# Patient Record
Sex: MALE | Race: BLACK OR AFRICAN AMERICAN | NOT HISPANIC OR LATINO | Employment: FULL TIME | URBAN - METROPOLITAN AREA
[De-identification: names, ages, dates, MRNs, and addresses within clinical notes are randomized per-mention and may not be internally consistent; named-entity substitution may affect disease eponyms.]

---

## 2025-05-05 ENCOUNTER — HOSPITAL ENCOUNTER (EMERGENCY)
Facility: HOSPITAL | Age: 30
Discharge: HOME | End: 2025-05-05
Attending: EMERGENCY MEDICINE | Admitting: EMERGENCY MEDICINE

## 2025-05-05 VITALS
SYSTOLIC BLOOD PRESSURE: 178 MMHG | RESPIRATION RATE: 20 BRPM | DIASTOLIC BLOOD PRESSURE: 105 MMHG | TEMPERATURE: 97.8 F | HEART RATE: 101 BPM | OXYGEN SATURATION: 97 %

## 2025-05-05 DIAGNOSIS — F15.10 METHAMPHETAMINE USE (HCC): ICD-10-CM

## 2025-05-05 DIAGNOSIS — F22 PARANOIA (HCC): Primary | ICD-10-CM

## 2025-05-05 PROCEDURE — 99284 EMERGENCY DEPT VISIT MOD MDM: CPT | Performed by: EMERGENCY MEDICINE

## 2025-05-05 PROCEDURE — 99284 EMERGENCY DEPT VISIT MOD MDM: CPT

## 2025-05-05 NOTE — ED PROVIDER NOTES
Time reflects when diagnosis was documented in both MDM as applicable and the Disposition within this note       Time User Action Codes Description Comment    5/5/2025  4:09 AM Patrick Espana [F22] Paranoia ([facility])     5/5/2025  4:09 AM Patrick Espana [F15.10] Methamphetamine use ([facility])           ED Disposition       ED Disposition   Discharge    Condition   Stable    Date/Time   Mon May 5, 2025  4:08 AM    Comment   Gia Morris discharge to home/self care.                   Assessment & Plan       Medical Decision Making  30 y/o male presents to the ED via EMS for evaluation of possible paranoia.  Per EMS report, the patient's called 911 to discuss with police 5 times over the course of the evening, due to his concerns that someone had broken into his room and was following him.  The police were concerned that the patient was experiencing paranoia and advised to come to the ED for evaluation.  The patient states that he rents a room in a boarding house and tonight felt like someone had broken through his window and was following him.  He tried calling his , however he was unable to talk to the manager so he left his room and went to his mother's house.  He describes seeing a shadow in the corner of his vision while he was on his way to his mother's house and thought that someone was following him.  He also reports hearing a familiar voice that he thought was possibly the , however he did not see him.  He denies any alcohol intake or substance use tonight but notes that he used a small amount of methamphetamine yesterday.  He denies any suicidal ideation or homicidal ideation.  He denies any physical symptoms or complaints.  He denies any history of psychiatric diagnoses or treatment.    Vital signs reviewed.  On my assessment, the patient is awake, speaking in full sentences, however his speech appears slightly pressured and tangential at times.  He verbalizes thoughts and  experiences that appear to be consistent with paranoia.  He denies any thoughts of self-harm or harming others, no SI or HI.  Although the patient describes briefly seeing a shadow in the corner of his vision or hearing a familiar voice earlier tonight, he does not at this time appear to be responding to any internal stimuli during my assessment.  The patient is able to maintain conversation and does not appear to have any self-care deficits, he does not appear unkempt or malnourished. See physical exam documentation for full exam findings.  Differential diagnosis includes but is not limited to substance induced psychosis, other psychosis, bipolar disorder, schizophrenia, and/or other primary psychiatric diagnosis.  I discussed with the patient regarding the events he has been experiencing over the last day and informed him that on my assessment he appears to be experiencing paranoia, which I will most likely suspect is related to his recent substance use.  I offered the patient evaluation including labs, UDS, and crisis evaluation, as well as the possibility of admission.  The patient declined any further evaluation or testing and also declined crisis evaluation.  The patient verbalizes that he does not want to sign in voluntarily for 201.  On my assessment, the patient does not appear to have any criteria for 302/involuntary admission, however due to his paranoia I once again encouraged the patient to reconsider regarding crisis evaluation and possible 201.  The patient again verbalizes that he is not interested in that course of action at this time.  The patient is requesting discharge home and he is stable for discharge at this time.  I provided the patient with list of outpatient resources as well as instruction to return to the ED if he reconsiders or feels like his symptoms are worsening.             Medications - No data to display    ED Risk Strat Scores                    No data recorded        SBIRT  22yo+      Flowsheet Row Most Recent Value   Initial Alcohol Screen: US AUDIT-C     1. How often do you have a drink containing alcohol? 0 Filed at: 05/05/2025 0348   2. How many drinks containing alcohol do you have on a typical day you are drinking?  0 Filed at: 05/05/2025 0348   3a. Male UNDER 65: How often do you have five or more drinks on one occasion? 0 Filed at: 05/05/2025 0348   Audit-C Score 0 Filed at: 05/05/2025 0348   GOLDY: How many times in the past year have you...    Used an illegal drug or used a prescription medication for non-medical reasons? Never Filed at: 05/05/2025 0348                            History of Present Illness       Chief Complaint   Patient presents with    Medical Problem     Pt brought in via EMS for potential paranoia and calling the police 5 times          History reviewed. No pertinent past medical history.   History reviewed. No pertinent surgical history.   Family History   Family history unknown: Yes      Social History     Tobacco Use    Smoking status: Every Day     Current packs/day: 0.50     Types: Cigarettes    Smokeless tobacco: Never   Vaping Use    Vaping status: Never Used   Substance Use Topics    Drug use: Yes     Types: Methamphetamines     Comment: last done 5/3/25      E-Cigarette/Vaping    E-Cigarette Use Never User       E-Cigarette/Vaping Substances      I have reviewed and agree with the history as documented.     28 y/o male presents to the ED via EMS for evaluation of possible paranoia.  Per EMS report, the patient's called 911 to discuss with police 5 times over the course of the evening, due to his concerns that someone had broken into his room and was following him.  The police were concerned that the patient was experiencing paranoia and advised to come to the ED for evaluation.  The patient states that he rents a room in a boarding house and tonight felt like someone had broken through his window and was following him.  He tried calling his  , however he was unable to talk to the manager so he left his room and went to his mother's house.  He describes seeing a shadow in the corner of his vision while he was on his way to his mother's house and thought that someone was following him.  He also reports hearing a familiar voice that he thought was possibly the , however he did not see him.  He denies any alcohol intake or substance use tonight but notes that he used a small amount of methamphetamine yesterday.  He denies any suicidal ideation or homicidal ideation.  He denies any physical symptoms or complaints.  He denies any history of psychiatric diagnoses or treatment.        Review of Systems   Constitutional:  Negative for chills and fever.   HENT:  Negative for congestion, rhinorrhea and sore throat.    Respiratory:  Negative for cough and shortness of breath.    Cardiovascular:  Negative for chest pain and palpitations.   Gastrointestinal:  Negative for abdominal pain, diarrhea, nausea and vomiting.   Genitourinary:  Negative for dysuria and hematuria.   Musculoskeletal:  Negative for back pain and neck pain.   Neurological:  Negative for weakness, light-headedness, numbness and headaches.   Psychiatric/Behavioral:          See HPI.   All other systems reviewed and are negative.          Objective       ED Triage Vitals [05/05/25 0346]   Temperature Pulse Blood Pressure Respirations SpO2 Patient Position - Orthostatic VS   97.8 °F (36.6 °C) 101 (!) 178/105 20 97 % --      Temp Source Heart Rate Source BP Location FiO2 (%) Pain Score    Oral Monitor -- -- No Pain      Vitals      Date and Time Temp Pulse SpO2 Resp BP Pain Score FACES Pain Rating User   05/05/25 0346 97.8 °F (36.6 °C) 101 97 % 20 178/105 No Pain -- CW            Physical Exam  Vitals and nursing note reviewed.   Constitutional:       General: He is not in acute distress.     Appearance: Normal appearance. He is normal weight.   HENT:      Head:  Normocephalic and atraumatic.      Right Ear: External ear normal.      Left Ear: External ear normal.      Nose: Nose normal.      Mouth/Throat:      Mouth: Mucous membranes are moist.      Pharynx: Oropharynx is clear. No oropharyngeal exudate or posterior oropharyngeal erythema.   Eyes:      Extraocular Movements: Extraocular movements intact.      Conjunctiva/sclera: Conjunctivae normal.      Pupils: Pupils are equal, round, and reactive to light.   Cardiovascular:      Rate and Rhythm: Normal rate and regular rhythm.      Pulses: Normal pulses.      Heart sounds: Normal heart sounds.   Pulmonary:      Effort: Pulmonary effort is normal. No respiratory distress.      Breath sounds: Normal breath sounds. No wheezing or rales.   Abdominal:      General: Abdomen is flat. Bowel sounds are normal. There is no distension.      Palpations: Abdomen is soft.      Tenderness: There is no abdominal tenderness. There is no right CVA tenderness, left CVA tenderness or guarding.   Musculoskeletal:         General: No swelling or tenderness. Normal range of motion.      Cervical back: Normal range of motion and neck supple. No tenderness.   Skin:     General: Skin is warm and dry.   Neurological:      General: No focal deficit present.      Mental Status: He is alert and oriented to person, place, and time.   Psychiatric:      Comments: On my assessment, the patient is awake, speaking in full sentences, however his speech appears slightly pressured and tangential at times.  He verbalizes thoughts and experiences that appear to be consistent with paranoia.  He denies any thoughts of self-harm or harming others, no SI or HI.  Although the patient describes briefly seeing a shadow in the corner of his vision or hearing a familiar voice earlier tonight, he does not at this time appear to be responding to any internal stimuli during my assessment.  The patient is able to maintain conversation and does not appear to have any  self-care deficits, he does not appear unkempt or malnourished.         Results Reviewed       None            No orders to display       Procedures    ED Medication and Procedure Management   None     Patient's Medications    No medications on file     No discharge procedures on file.  ED SEPSIS DOCUMENTATION   Time reflects when diagnosis was documented in both MDM as applicable and the Disposition within this note       Time User Action Codes Description Comment    5/5/2025  4:09 AM Patrick Espana [F22] Paranoia ([facility])     5/5/2025  4:09 AM Patrick Espana [F15.10] Methamphetamine use ([facility])                  Patrick Espana MD  05/05/25 0421

## 2025-05-19 ENCOUNTER — HOSPITAL ENCOUNTER (EMERGENCY)
Facility: HOSPITAL | Age: 30
Discharge: HOME | End: 2025-05-19
Attending: INTERNAL MEDICINE

## 2025-05-19 VITALS
WEIGHT: 180.78 LBS | BODY MASS INDEX: 26.78 KG/M2 | DIASTOLIC BLOOD PRESSURE: 76 MMHG | TEMPERATURE: 97.4 F | HEART RATE: 93 BPM | RESPIRATION RATE: 16 BRPM | SYSTOLIC BLOOD PRESSURE: 142 MMHG | HEIGHT: 69 IN | OXYGEN SATURATION: 100 %

## 2025-05-19 DIAGNOSIS — L03.115 CELLULITIS OF RIGHT LOWER EXTREMITY: Primary | ICD-10-CM

## 2025-05-19 PROCEDURE — 99282 EMERGENCY DEPT VISIT SF MDM: CPT

## 2025-05-19 PROCEDURE — 99284 EMERGENCY DEPT VISIT MOD MDM: CPT | Performed by: INTERNAL MEDICINE

## 2025-05-19 RX ORDER — CEPHALEXIN 500 MG/1
500 CAPSULE ORAL EVERY 6 HOURS SCHEDULED
Qty: 28 CAPSULE | Refills: 0 | Status: SHIPPED | OUTPATIENT
Start: 2025-05-19 | End: 2025-05-26

## 2025-05-19 RX ORDER — CEPHALEXIN 500 MG/1
500 CAPSULE ORAL EVERY 6 HOURS SCHEDULED
Qty: 28 CAPSULE | Refills: 0 | Status: SHIPPED | OUTPATIENT
Start: 2025-05-19 | End: 2025-05-19

## 2025-05-19 RX ADMIN — CEPHALEXIN 500 MG: 250 CAPSULE ORAL at 07:19

## 2025-05-19 NOTE — ED NOTES
"Pt is asked if he would like anyone called to pick him up. Pt responds with \"may I make a personal call.\" RN enforces that he make a call for a ride. In forms that pt that once he provides a number the RN will dial the number and give them the phone. Pt insists that he is given a phone to call whomever he chooses. RN informs the pt that this RN dials numbers and confirms connection before providing phones to pt. Pt insists that he will use the phone himself and demands RN give him the phone, refusing to give number of person he would like to call. RN offers to set pt up with a lyft home. Pt insists that he would like to use the phone for a \"personal call\". Pt pending discharge at this time.      Suma Bach RN  05/19/25 3131    "

## 2025-05-19 NOTE — ED NOTES
Pt states that he was visiting a friend late last night. He states he is experiencing a rodent infestation at his apartment for the past month waiting on the landlord to correct it. Pt states he thought he saw some bed bugs when he changed his sheets the other day. No bed bugs noted on today's assessment.      Suma Bach RN  05/19/25 0701

## 2025-05-19 NOTE — ED PROVIDER NOTES
Time reflects when diagnosis was documented in both MDM as applicable and the Disposition within this note       Time User Action Codes Description Comment    5/19/2025  7:30 AM Ashley Bynum Add [L03.115] Cellulitis of right lower extremity           ED Disposition       ED Disposition   Discharge    Condition   Stable    Date/Time   Mon May 19, 2025  7:31 AM    Comment   Gia Morris discharge to home/self care.                   Assessment & Plan       Medical Decision Making  This is a 29 years old brought by EMS as patient was found at the parking lot of [email or web address] with no shoes of shift after he was spending the night with his friend.  Patient stated that he has an abscess to the right thigh and he was bitten by possible insect or something else 2 weeks ago and he came here today to check it.  Patient also has possible insect bite at the sole of the left foot.  Patient has no fever and is not diabetic.  Patient has no discharge from the site of the bite.  Patient has history of paranoia and drug abuse in the past.  Patient denies taking any drugs or drinking alcohol.  Patient denies injecting himself with drugs.  Patient denies any psychiatric history.  Patient denies SI or HI.  The patient stated he does not want to see crisis or psychiatric.  Patient in no distress.  Physical exam significant for; At the anterior aspect of the right thigh, has small rounded area , could be at the site of the bite surrounding with mild erythema.  There is no discharge from it the area is not tender and it is not warm.  At the sole of the left foot has an area of redness but it is not tender no swelling no discharge.  Patient has skin infection which need antibiotic we will start him on Keflex.  Advised her to apply warm compresses on it.  And to follow-up with medical clinic.  Differential diagnoses include but not limited to; cellulitis, insect bite, eczema, allergic reaction.      Risk  Prescription drug  management.             Medications   cephalexin (KEFLEX) capsule 500 mg (500 mg Oral Given 5/19/25 0719)       ED Risk Strat Scores                    No data recorded        SBIRT 20yo+      Flowsheet Row Most Recent Value   Initial Alcohol Screen: US AUDIT-C     1. How often do you have a drink containing alcohol? 0 Filed at: 05/19/2025 0654   2. How many drinks containing alcohol do you have on a typical day you are drinking?  0 Filed at: 05/19/2025 0654   3a. Male UNDER 65: How often do you have five or more drinks on one occasion? 0 Filed at: 05/19/2025 0654   Audit-C Score 0 Filed at: 05/19/2025 0654   GOLDY: How many times in the past year have you...    Used an illegal drug or used a prescription medication for non-medical reasons? Monthly Filed at: 05/19/2025 0654                            History of Present Illness       Chief Complaint   Patient presents with    Abscess     Pt arrives via EMS PD called for pt at [facility]. Found with minimal clothing having been out all night. Pt states he has had a bug bite for 2 weeks on the right anterior thigh. Denies other symptoms. Denies medications PTA.        No past medical history on file.   No past surgical history on file.   Family History   Family history unknown: Yes      Social History[1]   E-Cigarette/Vaping    E-Cigarette Use Current Every Day User       E-Cigarette/Vaping Substances    Nicotine Yes       I have reviewed and agree with the history as documented.     This is a 29 years old brought by EMS, as patient was found in the parking lot of [employer].  Patient found with  no shirt or shoes.  A customer there called the police, the police called EMS and patient brought to the ER.  Patient stated that he has a bite possible from insect at the right thigh about 2 weeks ago and he has a bite on the sole of left foot, yesterday.  Patient  stated that this could be an abscess which need to be drained.  Patient has no fever, no  drainage, from the site of infection.  Patient has history of paranoia and history of drug abuse of methamphetamine.  Patient denies injecting himself with drugs.  Patient at the ER denies taking any drugs, or drinking alcohol.  Patient denies any psychiatric history at the ER.  Patient is not followed by a psychiatrist.  Patient denies SI or HI.  Offered to the patient to see crisis but he declined and he said he has no psych issues.  Patient laying on the bed calm quiet in no distress.      History provided by:  Patient   used: No        Review of Systems   Constitutional:  Negative for chills and fever.   HENT:  Negative for ear pain and sore throat.    Eyes:  Negative for pain and visual disturbance.   Respiratory:  Negative for cough and shortness of breath.    Cardiovascular:  Negative for chest pain and palpitations.   Gastrointestinal:  Negative for abdominal pain and vomiting.   Genitourinary:  Negative for dysuria and hematuria.   Musculoskeletal:  Negative for arthralgias and back pain.   Skin:  Negative for color change and rash.   Neurological:  Negative for seizures and syncope.   All other systems reviewed and are negative.          Objective       ED Triage Vitals [05/19/25 0651]   Temperature Pulse Blood Pressure Respirations SpO2 Patient Position - Orthostatic VS   (!) 97.4 °F (36.3 °C) 96 (!) 176/99 16 98 % Lying      Temp Source Heart Rate Source BP Location FiO2 (%) Pain Score    Oral Monitor Left arm -- 1      Vitals      Date and Time Temp Pulse SpO2 Resp BP Pain Score FACES Pain Rating User   05/19/25 0722 -- 93 100 % 16 142/76 1 -- FN   05/19/25 0655 -- -- -- -- -- 1 -- FN   05/19/25 0651 97.4 °F (36.3 °C) 96 98 % 16 176/99 1 -- FN            Physical Exam  Vitals and nursing note reviewed.   Constitutional:       General: He is not in acute distress.     Appearance: Normal appearance. He is well-developed. He is not ill-appearing, toxic-appearing or diaphoretic.   HENT:       Head: Normocephalic and atraumatic.      Right Ear: Ear canal normal.      Left Ear: Ear canal normal.      Nose: Nose normal. No congestion or rhinorrhea.      Mouth/Throat:      Mouth: Mucous membranes are moist.      Pharynx: No oropharyngeal exudate or posterior oropharyngeal erythema.     Eyes:      Extraocular Movements: Extraocular movements intact.      Conjunctiva/sclera: Conjunctivae normal.      Pupils: Pupils are equal, round, and reactive to light.       Cardiovascular:      Rate and Rhythm: Normal rate and regular rhythm.      Heart sounds: No murmur heard.     No friction rub. No gallop.   Pulmonary:      Effort: Pulmonary effort is normal. No respiratory distress.      Breath sounds: Normal breath sounds. No wheezing, rhonchi or rales.   Chest:      Chest wall: No tenderness.   Abdominal:      General: Abdomen is flat.      Palpations: Abdomen is soft.      Tenderness: There is no abdominal tenderness.     Musculoskeletal:         General: No swelling, tenderness, deformity or signs of injury.      Cervical back: Neck supple.      Right lower leg: No edema.      Left lower leg: No edema.     Skin:     General: Skin is warm and dry.      Capillary Refill: Capillary refill takes less than 2 seconds.      Coloration: Skin is not jaundiced or pale.      Findings: No bruising, erythema, lesion or rash.      Comments: At the anterior aspect of the right thigh, has small rounded area , could be at the site of the bite surrounding with mild erythema.  There is no discharge from it the area is not tender and it is not warm.  At the sole of the left foot has an area of redness but it is not tender no swelling no discharge.     Neurological:      Mental Status: He is alert and oriented to person, place, and time.     Psychiatric:         Mood and Affect: Mood normal.         Results Reviewed       None            No orders to display       Procedures    ED Medication and Procedure Management   None      Discharge Medication List as of 5/19/2025  7:32 AM        START taking these medications    Details   cephalexin (KEFLEX) 500 mg capsule Take 1 capsule (500 mg total) by mouth every 6 (six) hours for 7 days, Starting Mon 5/19/2025, Until Mon 5/26/2025, Normal           No discharge procedures on file.  ED SEPSIS DOCUMENTATION   Time reflects when diagnosis was documented in both MDM as applicable and the Disposition within this note       Time User Action Codes Description Comment    5/19/2025  7:30 AM Ashley Bynum Add [L03.115] Cellulitis of right lower extremity                      [1]   Social History  Tobacco Use    Smoking status: Former     Current packs/day: 0.50     Types: Cigarettes    Smokeless tobacco: Never   Vaping Use    Vaping status: Every Day    Substances: Nicotine   Substance Use Topics    Drug use: Yes     Types: Methamphetamines     Comment: last done 5/3/25        Ashley Bynum MD  05/20/25 0855

## 2025-05-19 NOTE — DISCHARGE INSTRUCTIONS
Follow-up with medical clinic.  Take antibiotics as prescribed.  Apply warm compresses to the area of infection.

## 2025-05-19 NOTE — ED NOTES
Pt states he would like a lyft and to make a personal phone call. Pt informed that he may make a call to someone for a ride and if he is provided a lyft, then the phone call is not needed because the lyft is his ride. Confirmed drop off address with pt.      Suma Bach RN  05/19/25 5467

## 2025-06-13 ENCOUNTER — HOSPITAL ENCOUNTER (EMERGENCY)
Facility: HOSPITAL | Age: 30
Discharge: HOME | End: 2025-06-13
Attending: EMERGENCY MEDICINE

## 2025-06-13 VITALS
RESPIRATION RATE: 16 BRPM | TEMPERATURE: 97.9 F | DIASTOLIC BLOOD PRESSURE: 92 MMHG | SYSTOLIC BLOOD PRESSURE: 151 MMHG | OXYGEN SATURATION: 100 % | HEART RATE: 94 BPM

## 2025-06-13 DIAGNOSIS — Z23 NEED FOR TETANUS, DIPHTHERIA, AND ACELLULAR PERTUSSIS (TDAP) VACCINE: ICD-10-CM

## 2025-06-13 DIAGNOSIS — K00.2: Primary | ICD-10-CM

## 2025-06-13 DIAGNOSIS — Z59.00 HOMELESSNESS: ICD-10-CM

## 2025-06-13 LAB
BASOPHILS # BLD AUTO: 0.02 THOUSANDS/ÂΜL (ref 0–0.1)
BASOPHILS NFR BLD AUTO: 0 % (ref 0–1)
EOSINOPHIL # BLD AUTO: 0.05 THOUSAND/ÂΜL (ref 0–0.61)
EOSINOPHIL NFR BLD AUTO: 1 % (ref 0–6)
ERYTHROCYTE [DISTWIDTH] IN BLOOD BY AUTOMATED COUNT: 13.6 % (ref 11.6–15.1)
HCT VFR BLD AUTO: 34.4 % (ref 36.5–49.3)
HGB BLD-MCNC: 11 G/DL (ref 12–17)
IMM GRANULOCYTES # BLD AUTO: 0.01 THOUSAND/UL (ref 0–0.2)
IMM GRANULOCYTES NFR BLD AUTO: 0 % (ref 0–2)
LYMPHOCYTES # BLD AUTO: 2.28 THOUSANDS/ÂΜL (ref 0.6–4.47)
LYMPHOCYTES NFR BLD AUTO: 50 % (ref 14–44)
MCH RBC QN AUTO: 27.9 PG (ref 26.8–34.3)
MCHC RBC AUTO-ENTMCNC: 32 G/DL (ref 31.4–37.4)
MCV RBC AUTO: 87 FL (ref 82–98)
MONOCYTES # BLD AUTO: 0.61 THOUSAND/ÂΜL (ref 0.17–1.22)
MONOCYTES NFR BLD AUTO: 13 % (ref 4–12)
NEUTROPHILS # BLD AUTO: 1.67 THOUSANDS/ÂΜL (ref 1.85–7.62)
NEUTS SEG NFR BLD AUTO: 36 % (ref 43–75)
NRBC BLD AUTO-RTO: 0 /100 WBCS
PLATELET # BLD AUTO: 210 THOUSANDS/UL (ref 149–390)
PMV BLD AUTO: 10 FL (ref 8.9–12.7)
RBC # BLD AUTO: 3.94 MILLION/UL (ref 3.88–5.62)
WBC # BLD AUTO: 4.64 THOUSAND/UL (ref 4.31–10.16)

## 2025-06-13 PROCEDURE — 99283 EMERGENCY DEPT VISIT LOW MDM: CPT

## 2025-06-13 PROCEDURE — 85025 COMPLETE CBC W/AUTO DIFF WBC: CPT | Performed by: PHYSICIAN ASSISTANT

## 2025-06-13 PROCEDURE — 36415 COLL VENOUS BLD VENIPUNCTURE: CPT | Performed by: PHYSICIAN ASSISTANT

## 2025-06-13 PROCEDURE — 99284 EMERGENCY DEPT VISIT MOD MDM: CPT | Performed by: PHYSICIAN ASSISTANT

## 2025-06-13 PROCEDURE — 90471 IMMUNIZATION ADMIN: CPT

## 2025-06-13 PROCEDURE — 90715 TDAP VACCINE 7 YRS/> IM: CPT | Performed by: PHYSICIAN ASSISTANT

## 2025-06-13 RX ORDER — CHLORHEXIDINE GLUCONATE ORAL RINSE 1.2 MG/ML
15 SOLUTION DENTAL ONCE
Status: COMPLETED | OUTPATIENT
Start: 2025-06-13 | End: 2025-06-13

## 2025-06-13 RX ORDER — CHLORHEXIDINE GLUCONATE ORAL RINSE 1.2 MG/ML
15 SOLUTION DENTAL 2 TIMES DAILY
Qty: 118 ML | Refills: 0 | Status: SHIPPED | OUTPATIENT
Start: 2025-06-13

## 2025-06-13 RX ADMIN — TETANUS TOXOID, REDUCED DIPHTHERIA TOXOID AND ACELLULAR PERTUSSIS VACCINE, ADSORBED 0.5 ML: 5; 2.5; 8; 8; 2.5 SUSPENSION INTRAMUSCULAR at 19:23

## 2025-06-13 RX ADMIN — CHLORHEXIDINE GLUCONATE 0.12% ORAL RINSE 15 ML: 1.2 LIQUID ORAL at 19:23

## 2025-06-13 SDOH — ECONOMIC STABILITY - HOUSING INSECURITY: HOMELESSNESS UNSPECIFIED: Z59.00

## 2025-06-13 NOTE — DISCHARGE INSTRUCTIONS
Please return to the emergency department for worsening symptoms including chest pain, shortness of breath, dizziness, lightheadedness, fever greater than 103, severe pain, inability to walk, fainting episodes, etc..  Please follow-up with your family practice provider as soon as possible.  I have sent medications over to the pharmacy for your symptoms.  Please take as directed.  Follow-up with a dentist.  I have given you a referral.  I have also given you a list of dentists in the area that you can call to make an appointment with.

## 2025-06-14 NOTE — ED PROVIDER NOTES
"Time reflects when diagnosis was documented in both MDM as applicable and the Disposition within this note       Time User Action Codes Description Comment    6/13/2025  7:42 PM Oleg Beauchamp [K00.2] Abnormal crown of tooth     6/13/2025  7:42 PM Oleg Beauchamp [Z23] Need for tetanus, diphtheria, and acellular pertussis (Tdap) vaccine     6/13/2025  7:43 PM Oleg Beauchamp [Z59.00] Homelessness           ED Disposition       ED Disposition   Discharge    Condition   Stable    Date/Time   Fri Jun 13, 2025  7:43 PM    Comment   Jasperaz Arturo discharge to home/self care.                   Assessment & Plan       Medical Decision Making  29-year-old male presenting to the emergency department for numerous medical complaints including \"abdominal vibrations\", need for tetanus shot, and missing crown to left lower dentition.  Also believes he had [place] semen on his leg but he has no semen noted on his leg on physical examination.  No suicidal or homicidal ideations.  Vitals are stable.  Afebrile.  He does have a missing crown to the left lower dentition without any underlying signs of infection.  No facial swelling.  Denying abdominal examination.  Differential diagnosis includes but is not limited to need for tetanus vaccination, dental pain, abnormal crown of tooth, etc.  He has no leukocytosis.  He was given Peridex while here in the emergency department secondary to loss of crown.  Tdap was updated.  He is stable for discharge at this time.  Follow-up outpatient with dentistry.  Referral placed.  Patient also given a list of dentists in the area that he can follow-up with.  Peridex sent to the patient's pharmacy.  Strict return precautions were given.  Recommend PCP follow-up as soon as possible. The patient and/or patient's proxy verify their understanding and agree to the plan at this time.  All questions answered to the patient and/or their proxy's satisfaction.  All labs reviewed " "and utilized in the medical decision making process (if labs were ordered).  Portions of the record may have been created with voice recognition software.  Occasional wrong word or \"sound a like\" substitutions may have occurred due to the inherent limitations of voice recognition software.  Read the chart carefully and recognize, using context, where substitutions have occurred.    I reviewed prior notes.    Problems Addressed:  Abnormal crown of tooth: undiagnosed new problem with uncertain prognosis  Homelessness: chronic illness or injury  Need for tetanus, diphtheria, and acellular pertussis (Tdap) vaccine: undiagnosed new problem with uncertain prognosis    Amount and/or Complexity of Data Reviewed  External Data Reviewed: notes.  Labs: ordered. Decision-making details documented in ED Course.    Risk  Prescription drug management.  Diagnosis or treatment significantly limited by social determinants of health.             Medications   chlorhexidine (PERIDEX) 0.12 % oral rinse 15 mL (15 mL Swish & Spit Given 6/13/25 1923)   tetanus-diphtheria-acellular pertussis (BOOSTRIX) IM injection 0.5 mL (0.5 mL Intramuscular Given 6/13/25 1923)       ED Risk Strat Scores                    No data recorded                            History of Present Illness       Chief Complaint   Patient presents with    Medical Problem     Brought in by EMS, pt reports \"my body feels like it is vibrating\", also c/o possible rabies, ear ringing, hole in his mouth that was not there before, and eye irritation       Past Medical History[1]   Past Surgical History[2]   Family History[3]   Social History[4]   E-Cigarette/Vaping    E-Cigarette Use Current Every Day User       E-Cigarette/Vaping Substances    Nicotine Yes       I have reviewed and agree with the history as documented.     This is a 29-year-old male with past medical history significant for psychiatric disorder and homelessness presenting to the emergency department today for " "numerous medical complaints.  The patient notes that he feels as though he has a \"everywhere infection\".  He notes a vibration sensation in his abdomen that he is concerned may be an infection.  He notes he had a bite approximately 1 month ago and was not given a tetanus shot and believes he needs this in the emergency department.  The patient also notes that he believes he got to the semen of a [place] on his leg.  He notes he saw this [place] walking down the street earlier this morning.  He denies any associated suicidal or homicidal ideations.  The patient does note that he is homeless.  He notes that he recently lost a crown to his left lower dentition and is requesting a referral to a dentist.  He denies any facial swelling, fevers, or chills.  He denies any chest pain or shortness of breath.  He denies other complaints at this time.      History provided by:  Patient   used: No    Medical Problem  Chronicity:  New  Associated symptoms: no abdominal pain, no chest pain, no congestion, no cough, no diarrhea, no ear pain, no fatigue, no fever, no headaches, no loss of consciousness, no myalgias, no nausea, no rash, no rhinorrhea, no shortness of breath, no sore throat, no vomiting and no wheezing        Review of Systems   Constitutional:  Negative for appetite change, chills, diaphoresis, fatigue and fever.   HENT:  Positive for dental problem. Negative for congestion, ear pain, rhinorrhea and sore throat.    Eyes:  Negative for visual disturbance.   Respiratory:  Negative for cough, chest tightness, shortness of breath and wheezing.    Cardiovascular:  Negative for chest pain, palpitations and leg swelling.   Gastrointestinal:  Negative for abdominal pain, constipation, diarrhea, nausea and vomiting.   Musculoskeletal:  Negative for myalgias, neck pain and neck stiffness.   Skin:  Negative for rash and wound.   Neurological:  Negative for dizziness, seizures, loss of consciousness, syncope, " weakness, light-headedness, numbness and headaches.   Psychiatric/Behavioral:  Negative for confusion.    All other systems reviewed and are negative.          Objective       ED Triage Vitals   Temperature Pulse Blood Pressure Respirations SpO2 Patient Position - Orthostatic VS   06/13/25 1830 06/13/25 1826 06/13/25 1826 06/13/25 1826 06/13/25 1826 06/13/25 1948   97.9 °F (36.6 °C) 104 (!) 141/106 16 100 % Lying      Temp src Heart Rate Source BP Location FiO2 (%) Pain Score    -- 06/13/25 1948 06/13/25 1948 -- 06/13/25 1948     Monitor Right arm  No Pain      Vitals      Date and Time Temp Pulse SpO2 Resp BP Pain Score FACES Pain Rating User   06/13/25 1948 -- 94 100 % 16 151/92 No Pain -- MS   06/13/25 1830 97.9 °F (36.6 °C) -- -- -- -- -- -- BG   06/13/25 1826 -- 104 100 % 16 141/106 -- -- BG            Physical Exam  Vitals and nursing note reviewed.   Constitutional:       General: He is not in acute distress.     Appearance: Normal appearance. He is normal weight. He is not ill-appearing, toxic-appearing or diaphoretic.   HENT:      Head: Normocephalic and atraumatic.      Nose: Nose normal. No congestion or rhinorrhea.      Mouth/Throat:      Mouth: Mucous membranes are moist.      Pharynx: No oropharyngeal exudate or posterior oropharyngeal erythema.       Eyes:      General: No scleral icterus.        Right eye: No discharge.         Left eye: No discharge.      Conjunctiva/sclera: Conjunctivae normal.       Cardiovascular:      Rate and Rhythm: Normal rate and regular rhythm.      Pulses: Normal pulses.      Heart sounds: Normal heart sounds. No murmur heard.     No friction rub. No gallop.   Pulmonary:      Effort: Pulmonary effort is normal. No respiratory distress.      Breath sounds: Normal breath sounds. No stridor. No wheezing, rhonchi or rales.   Chest:      Chest wall: No tenderness.   Abdominal:      General: Abdomen is flat.      Tenderness: There is no abdominal tenderness. There is no right  CVA tenderness, left CVA tenderness or guarding.      Comments: Abdomen is soft, nontender, nondistended, and without organomegaly.  No rebound, Rovsing, or McBurney's point tenderness.  Negative Campbell sign.  Nonperitoneal.     Musculoskeletal:         General: Normal range of motion.      Cervical back: Normal range of motion. No rigidity.      Right lower leg: No edema.      Left lower leg: No edema.     Skin:     General: Skin is warm and dry.      Capillary Refill: Capillary refill takes less than 2 seconds.      Coloration: Skin is not jaundiced or pale.      Comments: Superficial abrasion to the right thigh.  This does not appear to be a bite catina.  There is no surrounding redness or swelling to suspect cellulitis.  It is not warm to the touch or fluctuant.     Neurological:      General: No focal deficit present.      Mental Status: He is alert and oriented to person, place, and time. Mental status is at baseline.     Psychiatric:         Mood and Affect: Mood normal.         Behavior: Behavior normal.         Results Reviewed       Procedure Component Value Units Date/Time    CBC and differential [[id number]]  (Abnormal) Collected: 06/13/25 1934    Lab Status: Final result Specimen: Blood from Arm, Right Updated: 06/13/25 1939     WBC 4.64 Thousand/uL      RBC 3.94 Million/uL      Hemoglobin 11.0 g/dL      Hematocrit 34.4 %      MCV 87 fL      MCH 27.9 pg      MCHC 32.0 g/dL      RDW 13.6 %      MPV 10.0 fL      Platelets 210 Thousands/uL      nRBC 0 /100 WBCs      Segmented % 36 %      Immature Grans % 0 %      Lymphocytes % 50 %      Monocytes % 13 %      Eosinophils Relative 1 %      Basophils Relative 0 %      Absolute Neutrophils 1.67 Thousands/µL      Absolute Immature Grans 0.01 Thousand/uL      Absolute Lymphocytes 2.28 Thousands/µL      Absolute Monocytes 0.61 Thousand/µL      Eosinophils Absolute 0.05 Thousand/µL      Basophils Absolute 0.02 Thousands/µL             No orders to display        Procedures    ED Medication and Procedure Management   None     Discharge Medication List as of 6/13/2025  7:43 PM        START taking these medications    Details   chlorhexidine (PERIDEX) 0.12 % solution Apply 15 mL to the mouth or throat 2 (two) times a day, Starting Fri 6/13/2025, Print             ED SEPSIS DOCUMENTATION   Time reflects when diagnosis was documented in both MDM as applicable and the Disposition within this note       Time User Action Codes Description Comment    6/13/2025  7:42 PM Oleg Beauchamp [K00.2] Abnormal crown of tooth     6/13/2025  7:42 PM Oleg Beauchamp [Z23] Need for tetanus, diphtheria, and acellular pertussis (Tdap) vaccine     6/13/2025  7:43 PM Oleg Beauchamp [Z59.00] Homelessness                    [1] No past medical history on file.  [2] No past surgical history on file.  [3]   Family History  Family history unknown: Yes   [4]   Social History  Tobacco Use    Smoking status: Former     Current packs/day: 0.50     Types: Cigarettes    Smokeless tobacco: Never   Vaping Use    Vaping status: Every Day    Substances: Nicotine   Substance Use Topics    Drug use: Yes     Types: Methamphetamines     Comment: last done 5/3/25        Oleg Beauchamp PA-C  06/14/25 1001